# Patient Record
Sex: MALE | Race: WHITE | NOT HISPANIC OR LATINO | URBAN - METROPOLITAN AREA
[De-identification: names, ages, dates, MRNs, and addresses within clinical notes are randomized per-mention and may not be internally consistent; named-entity substitution may affect disease eponyms.]

---

## 2020-05-20 ENCOUNTER — COMPLETE SKIN EXAM (OUTPATIENT)
Dept: URBAN - METROPOLITAN AREA CLINIC 13 | Facility: CLINIC | Age: 75
Setting detail: DERMATOLOGY
End: 2020-05-20

## 2020-05-20 DIAGNOSIS — L82.0 INFLAMED SEBORRHEIC KERATOSIS: ICD-10-CM

## 2020-05-20 PROCEDURE — 17004 DESTROY PREMAL LESIONS 15/>: CPT

## 2020-05-20 PROCEDURE — 99214 OFFICE O/P EST MOD 30 MIN: CPT

## 2020-05-20 PROCEDURE — 11102 TANGNTL BX SKIN SINGLE LES: CPT

## 2020-06-17 ENCOUNTER — RX ONLY (RX ONLY)
Age: 75
End: 2020-06-17

## 2020-06-17 ENCOUNTER — EXCISION (OUTPATIENT)
Dept: URBAN - METROPOLITAN AREA CLINIC 11 | Facility: CLINIC | Age: 75
Setting detail: DERMATOLOGY
End: 2020-06-17

## 2020-06-17 DIAGNOSIS — L70.0 ACNE VULGARIS: ICD-10-CM

## 2020-06-17 PROCEDURE — 99212 OFFICE O/P EST SF 10 MIN: CPT

## 2020-06-17 PROCEDURE — 12032 INTMD RPR S/A/T/EXT 2.6-7.5: CPT

## 2020-06-17 PROCEDURE — 11103 TANGNTL BX SKIN EA SEP/ADDL: CPT

## 2020-06-17 PROCEDURE — 11602 EXC TR-EXT MAL+MARG 1.1-2 CM: CPT

## 2020-06-17 PROCEDURE — 11102 TANGNTL BX SKIN SINGLE LES: CPT

## 2020-06-17 RX ORDER — METRONIDAZOLE 7.5 MG/G
1 APPLICATION CREAM TOPICAL BID
Qty: 45 | Refills: 2
Start: 2020-06-17

## 2020-07-15 ENCOUNTER — EXCISION (OUTPATIENT)
Dept: URBAN - METROPOLITAN AREA CLINIC 11 | Facility: CLINIC | Age: 75
Setting detail: DERMATOLOGY
End: 2020-07-15

## 2020-07-15 DIAGNOSIS — D18.01 HEMANGIOMA OF SKIN AND SUBCUTANEOUS TISSUE: ICD-10-CM

## 2020-07-15 DIAGNOSIS — L81.4 OTHER MELANIN HYPERPIGMENTATION: ICD-10-CM

## 2020-07-15 DIAGNOSIS — L82.1 OTHER SEBORRHEIC KERATOSIS: ICD-10-CM

## 2020-07-15 DIAGNOSIS — Z85.828 PERSONAL HISTORY OF OTHER MALIGNANT NEOPLASM OF SKIN: ICD-10-CM

## 2020-07-15 PROCEDURE — 12044 INTMD RPR N-HF/GENIT7.6-12.5: CPT

## 2020-07-15 PROCEDURE — 11622 EXC S/N/H/F/G MAL+MRG 1.1-2: CPT

## 2020-07-21 ENCOUNTER — OTHER- (OUTPATIENT)
Dept: URBAN - METROPOLITAN AREA CLINIC 11 | Facility: CLINIC | Age: 75
Setting detail: DERMATOLOGY
End: 2020-07-21

## 2020-07-21 ENCOUNTER — RX ONLY (RX ONLY)
Age: 75
End: 2020-07-21

## 2020-07-21 DIAGNOSIS — D04.39 CARCINOMA IN SITU OF SKIN OF OTHER PARTS OF FACE: ICD-10-CM

## 2020-07-21 PROCEDURE — 99024 POSTOP FOLLOW-UP VISIT: CPT

## 2020-07-21 RX ORDER — DOXYCYCLINE HYCLATE 100 MG/1
1 CAPSULE CAPSULE, GELATIN COATED ORAL BID
Qty: 20 | Refills: 0
Start: 2020-07-21

## 2020-09-30 ENCOUNTER — FOLLOW-UP (OUTPATIENT)
Dept: URBAN - METROPOLITAN AREA CLINIC 13 | Facility: CLINIC | Age: 75
Setting detail: DERMATOLOGY
End: 2020-09-30

## 2020-09-30 DIAGNOSIS — L85.3 XEROSIS CUTIS: ICD-10-CM

## 2020-09-30 DIAGNOSIS — C44.42 SQUAMOUS CELL CARCINOMA OF SKIN OF SCALP AND NECK: ICD-10-CM

## 2020-09-30 PROCEDURE — 11102 TANGNTL BX SKIN SINGLE LES: CPT

## 2020-11-10 ENCOUNTER — MOHS SURGERY-ROUTINE (OUTPATIENT)
Dept: URBAN - METROPOLITAN AREA CLINIC 11 | Facility: CLINIC | Age: 75
Setting detail: DERMATOLOGY
End: 2020-11-10

## 2020-11-10 DIAGNOSIS — L82.1 OTHER SEBORRHEIC KERATOSIS: ICD-10-CM

## 2020-11-10 DIAGNOSIS — D22.5 MELANOCYTIC NEVI OF TRUNK: ICD-10-CM

## 2020-11-10 PROCEDURE — 12042 INTMD RPR N-HF/GENIT2.6-7.5: CPT

## 2020-11-10 PROCEDURE — 17311 MOHS 1 STAGE H/N/HF/G: CPT

## 2020-11-20 ENCOUNTER — COMPLETE SKIN EXAM (OUTPATIENT)
Dept: URBAN - METROPOLITAN AREA CLINIC 13 | Facility: CLINIC | Age: 75
Setting detail: DERMATOLOGY
End: 2020-11-20

## 2020-11-20 DIAGNOSIS — D48.5 NEOPLASM OF UNCERTAIN BEHAVIOR OF SKIN: ICD-10-CM

## 2020-11-20 PROCEDURE — 99214 OFFICE O/P EST MOD 30 MIN: CPT

## 2020-11-20 PROCEDURE — 17110 DESTRUCT B9 LESION 1-14: CPT

## 2020-11-20 PROCEDURE — 17000 DESTRUCT PREMALG LESION: CPT

## 2020-11-20 PROCEDURE — 17003 DESTRUCT PREMALG LES 2-14: CPT

## 2020-11-24 ENCOUNTER — SUTURE REMOVAL (OUTPATIENT)
Dept: URBAN - METROPOLITAN AREA CLINIC 11 | Facility: CLINIC | Age: 75
Setting detail: DERMATOLOGY
End: 2020-11-24

## 2020-11-24 DIAGNOSIS — L21.8 OTHER SEBORRHEIC DERMATITIS: ICD-10-CM

## 2020-11-24 PROCEDURE — 11102 TANGNTL BX SKIN SINGLE LES: CPT

## 2020-12-07 ENCOUNTER — OTHER- (OUTPATIENT)
Dept: URBAN - METROPOLITAN AREA CLINIC 11 | Facility: CLINIC | Age: 75
Setting detail: DERMATOLOGY
End: 2020-12-07

## 2020-12-07 DIAGNOSIS — L28.0 LICHEN SIMPLEX CHRONICUS: ICD-10-CM

## 2020-12-07 PROCEDURE — 96405 CHEMO INTRALESIONAL UP TO 7: CPT

## 2020-12-15 ENCOUNTER — OTHER- (OUTPATIENT)
Dept: URBAN - METROPOLITAN AREA CLINIC 11 | Facility: CLINIC | Age: 75
Setting detail: DERMATOLOGY
End: 2020-12-15

## 2020-12-15 DIAGNOSIS — L82.0 INFLAMED SEBORRHEIC KERATOSIS: ICD-10-CM

## 2020-12-15 PROCEDURE — 96405 CHEMO INTRALESIONAL UP TO 7: CPT

## 2021-01-11 ENCOUNTER — MOHS SURGERY - FOLLOW UP (OUTPATIENT)
Dept: URBAN - METROPOLITAN AREA CLINIC 11 | Facility: CLINIC | Age: 76
Setting detail: DERMATOLOGY
End: 2021-01-11

## 2021-01-11 DIAGNOSIS — D48.5 NEOPLASM OF UNCERTAIN BEHAVIOR OF SKIN: ICD-10-CM

## 2021-01-11 PROCEDURE — 11102 TANGNTL BX SKIN SINGLE LES: CPT

## 2021-01-11 PROCEDURE — 99212 OFFICE O/P EST SF 10 MIN: CPT

## 2021-01-12 PROBLEM — Z85828 V10.83: Status: ACTIVE | Noted: 2021-01-12

## 2021-02-08 ENCOUNTER — EXCISION (OUTPATIENT)
Dept: URBAN - METROPOLITAN AREA CLINIC 11 | Facility: CLINIC | Age: 76
Setting detail: DERMATOLOGY
End: 2021-02-08

## 2021-02-08 DIAGNOSIS — C44.722 SQUAMOUS CELL CARCINOMA OF SKIN OF RIGHT LOWER LIMB, INCLUDING HIP: ICD-10-CM

## 2021-02-08 PROCEDURE — 11602 EXC TR-EXT MAL+MARG 1.1-2 CM: CPT

## 2021-02-08 PROCEDURE — 12032 INTMD RPR S/A/T/EXT 2.6-7.5: CPT

## 2021-05-17 ENCOUNTER — COMPLETE SKIN EXAM (OUTPATIENT)
Dept: URBAN - METROPOLITAN AREA CLINIC 11 | Facility: CLINIC | Age: 76
Setting detail: DERMATOLOGY
End: 2021-05-17

## 2021-05-17 ENCOUNTER — RX ONLY (RX ONLY)
Age: 76
End: 2021-05-17

## 2021-05-17 DIAGNOSIS — D22.4 MELANOCYTIC NEVI OF SCALP AND NECK: ICD-10-CM

## 2021-05-17 PROCEDURE — 99214 OFFICE O/P EST MOD 30 MIN: CPT

## 2021-05-17 PROCEDURE — 11301 SHAVE SKIN LESION 0.6-1.0 CM: CPT

## 2021-05-17 RX ORDER — FLUOROURACIL 50 MG/G
AS DIRECTED CREAM TOPICAL EVERY NIGHT
Qty: 40 | Refills: 1
Start: 2021-05-17

## 2021-11-23 ENCOUNTER — COMPLETE SKIN EXAM (OUTPATIENT)
Dept: URBAN - METROPOLITAN AREA CLINIC 11 | Facility: CLINIC | Age: 76
Setting detail: DERMATOLOGY
End: 2021-11-23

## 2021-11-23 DIAGNOSIS — D48.5 NEOPLASM OF UNCERTAIN BEHAVIOR OF SKIN: ICD-10-CM

## 2021-11-23 PROCEDURE — 99214 OFFICE O/P EST MOD 30 MIN: CPT

## 2021-12-14 ENCOUNTER — PDT (OUTPATIENT)
Dept: URBAN - METROPOLITAN AREA CLINIC 11 | Facility: CLINIC | Age: 76
Setting detail: DERMATOLOGY
End: 2021-12-14

## 2021-12-14 PROCEDURE — 96573 PDT DSTR PRMLG LES PHYS/QHP: CPT

## 2022-01-27 ENCOUNTER — PDT (OUTPATIENT)
Dept: URBAN - METROPOLITAN AREA CLINIC 11 | Facility: CLINIC | Age: 77
Setting detail: DERMATOLOGY
End: 2022-01-27

## 2022-01-27 DIAGNOSIS — L30.9 DERMATITIS, UNSPECIFIED: ICD-10-CM

## 2022-01-27 PROCEDURE — 96573 PDT DSTR PRMLG LES PHYS/QHP: CPT

## 2022-05-24 ENCOUNTER — COMPLETE SKIN EXAM (OUTPATIENT)
Dept: URBAN - METROPOLITAN AREA CLINIC 11 | Facility: CLINIC | Age: 77
Setting detail: DERMATOLOGY
End: 2022-05-24

## 2022-05-24 DIAGNOSIS — L73.2 HIDRADENITIS SUPPURATIVA: ICD-10-CM

## 2022-05-24 DIAGNOSIS — D23.4 OTHER BENIGN NEOPLASM OF SKIN OF SCALP AND NECK: ICD-10-CM

## 2022-05-24 PROCEDURE — 17003 DESTRUCT PREMALG LES 2-14: CPT

## 2022-05-24 PROCEDURE — 99213 OFFICE O/P EST LOW 20 MIN: CPT

## 2022-05-24 PROCEDURE — 17000 DESTRUCT PREMALG LESION: CPT

## 2022-05-24 PROCEDURE — 11102 TANGNTL BX SKIN SINGLE LES: CPT

## 2022-09-22 ENCOUNTER — APPOINTMENT (OUTPATIENT)
Dept: URBAN - METROPOLITAN AREA SURGERY 19 | Age: 77
Setting detail: DERMATOLOGY
End: 2022-09-23

## 2022-09-22 DIAGNOSIS — L81.4 OTHER MELANIN HYPERPIGMENTATION: ICD-10-CM

## 2022-09-22 PROBLEM — D48.5 NEOPLASM OF UNCERTAIN BEHAVIOR OF SKIN: Status: ACTIVE | Noted: 2022-09-22

## 2022-09-22 PROBLEM — D04.61 CARCINOMA IN SITU OF SKIN OF RIGHT UPPER LIMB, INCLUDING SHOULDER: Status: ACTIVE | Noted: 2022-09-22

## 2022-09-22 PROCEDURE — OTHER CRYOSURGICAL DESTRUCTION: OTHER

## 2022-09-22 PROCEDURE — 11102 TANGNTL BX SKIN SINGLE LES: CPT

## 2022-09-22 PROCEDURE — 17262 DSTRJ MAL LES T/A/L 1.1-2.0: CPT | Mod: 59

## 2022-09-22 PROCEDURE — OTHER BIOPSY BY SHAVE METHOD: OTHER

## 2022-09-22 ASSESSMENT — LOCATION ZONE DERM: LOCATION ZONE: LEG

## 2022-09-22 ASSESSMENT — LOCATION DETAILED DESCRIPTION DERM: LOCATION DETAILED: LEFT DISTAL PRETIBIAL REGION

## 2022-09-22 ASSESSMENT — LOCATION SIMPLE DESCRIPTION DERM: LOCATION SIMPLE: LEFT PRETIBIAL REGION

## 2022-09-22 NOTE — PROCEDURE: CRYOSURGICAL DESTRUCTION
Body Location Override (Optional - Billing Will Still Be Based On Selected Body Map Location If Applicable): lateral right elbow
Detail Level: Detailed
Add Intralesional Injection: No
Medication Injected: 5-Fluorouracil
Concentration (Mg/Ml Or Millions Of Plaque Forming Units/Cc): 0.01
Total Volume (Ccs): 1
Anesthesia Volume In Cc: 0
Number Of Freeze-Thaw Cycles: 3 freeze-thaw cycles
Additional Information: (Optional): Wound care instructions discussed and provided in writing.
Pre-Procedure: The surgical site was antiseptically prepared.
Consent was obtained from the patient. The risks and benefits to therapy were discussed in detail. Specifically, the risks of infection, scarring, bleeding, prolonged wound healing, incomplete removal, allergy to anesthesia, nerve injury and recurrence were addressed. Alternatives to liquid nitrogen, such as ED&C, surgical removal, XRT were also discussed.  Prior to the procedure, the treatment site was clearly identified and confirmed by the patient. All components of Universal Protocol/PAUSE Rule completed.
Bill As A Line Item Or As Units: Line Item

## 2022-09-22 NOTE — PROCEDURE: BIOPSY BY SHAVE METHOD
Body Location Override (Optional - Billing Will Still Be Based On Selected Body Map Location If Applicable): inferior medial left lower leg

## 2022-11-28 ENCOUNTER — APPOINTMENT (OUTPATIENT)
Dept: URBAN - METROPOLITAN AREA SURGERY 19 | Age: 77
Setting detail: DERMATOLOGY
End: 2022-11-29

## 2022-11-28 DIAGNOSIS — L82.1 OTHER SEBORRHEIC KERATOSIS: ICD-10-CM

## 2022-11-28 DIAGNOSIS — Z85.828 PERSONAL HISTORY OF OTHER MALIGNANT NEOPLASM OF SKIN: ICD-10-CM

## 2022-11-28 DIAGNOSIS — D18.0 HEMANGIOMA: ICD-10-CM

## 2022-11-28 DIAGNOSIS — L03.03 CELLULITIS OF TOE: ICD-10-CM

## 2022-11-28 DIAGNOSIS — L57.0 ACTINIC KERATOSIS: ICD-10-CM

## 2022-11-28 DIAGNOSIS — L81.4 OTHER MELANIN HYPERPIGMENTATION: ICD-10-CM

## 2022-11-28 PROBLEM — L03.031 CELLULITIS OF RIGHT TOE: Status: ACTIVE | Noted: 2022-11-28

## 2022-11-28 PROBLEM — D48.5 NEOPLASM OF UNCERTAIN BEHAVIOR OF SKIN: Status: ACTIVE | Noted: 2022-11-28

## 2022-11-28 PROBLEM — D18.01 HEMANGIOMA OF SKIN AND SUBCUTANEOUS TISSUE: Status: ACTIVE | Noted: 2022-11-28

## 2022-11-28 PROCEDURE — OTHER SUNSCREEN RECOMMENDATIONS: OTHER

## 2022-11-28 PROCEDURE — 11102 TANGNTL BX SKIN SINGLE LES: CPT

## 2022-11-28 PROCEDURE — OTHER BIOPSY BY SHAVE METHOD: OTHER

## 2022-11-28 PROCEDURE — 17003 DESTRUCT PREMALG LES 2-14: CPT

## 2022-11-28 PROCEDURE — OTHER MIPS QUALITY: OTHER

## 2022-11-28 PROCEDURE — 99214 OFFICE O/P EST MOD 30 MIN: CPT | Mod: 25

## 2022-11-28 PROCEDURE — OTHER PRESCRIPTION: OTHER

## 2022-11-28 PROCEDURE — OTHER LIQUID NITROGEN: OTHER

## 2022-11-28 PROCEDURE — OTHER COUNSELING: OTHER

## 2022-11-28 PROCEDURE — 17000 DESTRUCT PREMALG LESION: CPT | Mod: 59

## 2022-11-28 PROCEDURE — OTHER ORDER TESTS: OTHER

## 2022-11-28 RX ORDER — DOXYCYCLINE HYCLATE 100 MG/1
CAPSULE, GELATIN COATED ORAL
Qty: 20 | Refills: 0 | Status: ERX | COMMUNITY
Start: 2022-11-28

## 2022-11-28 ASSESSMENT — LOCATION SIMPLE DESCRIPTION DERM
LOCATION SIMPLE: LEFT EAR
LOCATION SIMPLE: RIGHT GREAT TOE
LOCATION SIMPLE: RIGHT HAND
LOCATION SIMPLE: ABDOMEN
LOCATION SIMPLE: RIGHT FOREHEAD
LOCATION SIMPLE: RIGHT FOREARM
LOCATION SIMPLE: RIGHT UPPER BACK
LOCATION SIMPLE: LEFT HAND
LOCATION SIMPLE: LEFT FOREARM

## 2022-11-28 ASSESSMENT — LOCATION ZONE DERM
LOCATION ZONE: TOE
LOCATION ZONE: TRUNK
LOCATION ZONE: FACE
LOCATION ZONE: HAND
LOCATION ZONE: ARM
LOCATION ZONE: EAR
LOCATION ZONE: TOENAIL

## 2022-11-28 ASSESSMENT — LOCATION DETAILED DESCRIPTION DERM
LOCATION DETAILED: RIGHT GREAT TOENAIL
LOCATION DETAILED: LEFT SUPERIOR CRUS OF ANTIHELIX
LOCATION DETAILED: RIGHT RADIAL DORSAL HAND
LOCATION DETAILED: RIGHT INFERIOR UPPER BACK
LOCATION DETAILED: LEFT DISTAL DORSAL FOREARM
LOCATION DETAILED: RIGHT DORSAL GREAT TOE
LOCATION DETAILED: EPIGASTRIC SKIN
LOCATION DETAILED: LEFT ULNAR DORSAL HAND
LOCATION DETAILED: RIGHT VENTRAL PROXIMAL FOREARM
LOCATION DETAILED: RIGHT INFERIOR FOREHEAD

## 2022-11-28 NOTE — PROCEDURE: BIOPSY BY SHAVE METHOD
Post-Care Instructions: I reviewed with the patient in detail post-care instructions. Patient is to keep the biopsy site dry overnight, and then apply bacitracin twice daily until healed. Patient may apply hydrogen peroxide soaks to remove any crusting. Yes

## 2022-11-28 NOTE — PROCEDURE: BIOPSY BY SHAVE METHOD
Body Location Override (Optional - Billing Will Still Be Based On Selected Body Map Location If Applicable): right lateral flexor wrist

## 2022-11-28 NOTE — PROCEDURE: ORDER TESTS
Expected Date Of Service: 11/28/2022
Billing Type: Third-Party Bill
Bill For Surgical Tray: no
Performing Laboratory: [id number]
Lab Facility: [id number]

## 2022-12-13 ENCOUNTER — APPOINTMENT (OUTPATIENT)
Dept: URBAN - METROPOLITAN AREA SURGERY 19 | Age: 77
Setting detail: DERMATOLOGY
End: 2022-12-14

## 2022-12-13 DIAGNOSIS — L03.03 CELLULITIS OF TOE: ICD-10-CM

## 2022-12-13 PROBLEM — D04.61 CARCINOMA IN SITU OF SKIN OF RIGHT UPPER LIMB, INCLUDING SHOULDER: Status: ACTIVE | Noted: 2022-12-13

## 2022-12-13 PROBLEM — L03.031 CELLULITIS OF RIGHT TOE: Status: ACTIVE | Noted: 2022-12-13

## 2022-12-13 PROCEDURE — OTHER CURETTAGE AND DESTRUCTION: OTHER

## 2022-12-13 PROCEDURE — 17261 DSTRJ MAL LES T/A/L .6-1.0CM: CPT

## 2022-12-13 PROCEDURE — OTHER COUNSELING: OTHER

## 2022-12-13 PROCEDURE — 99212 OFFICE O/P EST SF 10 MIN: CPT | Mod: 25

## 2022-12-13 ASSESSMENT — LOCATION ZONE DERM: LOCATION ZONE: TOE

## 2022-12-13 ASSESSMENT — LOCATION DETAILED DESCRIPTION DERM: LOCATION DETAILED: RIGHT DORSAL 2ND TOE

## 2022-12-13 ASSESSMENT — LOCATION SIMPLE DESCRIPTION DERM: LOCATION SIMPLE: RIGHT 2ND TOE

## 2022-12-13 NOTE — HPI: INFECTION (PARONYCHIA)
How Severe Is It?: mild
Is This A New Presentation, Or A Follow-Up?: Follow Up Paronychia
Additional History: bacterial culture negative; affected toe looks much better, but he cannot feel it given neuropathy

## 2022-12-13 NOTE — PROCEDURE: CURETTAGE AND DESTRUCTION
Body Location Override (Optional - Billing Will Still Be Based On Selected Body Map Location If Applicable): flexor right wrist
Detail Level: Detailed
Biopsy Photograph Reviewed: Yes
Number Of Curettages: 3
Size Of Lesion In Cm: 0.7
Add Intralesional Injection: No
Concentration (Mg/Ml Or Millions Of Plaque Forming Units/Cc): 0.01
Total Volume (Ccs): 1
Anesthesia Type: 1% lidocaine with epinephrine
Anesthesia Volume In Cc: 0.5
Cautery Type: electrodesiccation
What Was Performed First?: Curettage
Final Size Statement: The size of the lesion after curettage was
Additional Information: (Optional): The wound was cleaned, and a pressure dressing was applied.  The patient received detailed post-op instructions.
Consent was obtained from the patient. The risks, benefits and alternatives to therapy were discussed in detail. Specifically, the risks of infection, scarring, bleeding, prolonged wound healing, nerve injury, incomplete removal, allergy to anesthesia and recurrence were addressed. Alternatives to ED&C, such as surgical removal and topical therapy, were also discussed.  Prior to the procedure, the treatment site was clearly identified and confirmed by the patient. All components of Universal Protocol/PAUSE Rule completed.
Post-Care Instructions: I reviewed with the patient in detail post-care instructions. Patient is to keep the area dry for 24 hours, and not to engage in any swimming until the area is healed. Should the patient develop any fevers, chills, bleeding, severe pain patient will contact the office immediately.
Bill As A Line Item Or As Units: Line Item